# Patient Record
Sex: FEMALE | Race: BLACK OR AFRICAN AMERICAN | NOT HISPANIC OR LATINO | Employment: FULL TIME | URBAN - METROPOLITAN AREA
[De-identification: names, ages, dates, MRNs, and addresses within clinical notes are randomized per-mention and may not be internally consistent; named-entity substitution may affect disease eponyms.]

---

## 2019-05-02 ENCOUNTER — OFFICE VISIT (OUTPATIENT)
Dept: NEUROSURGERY | Facility: CLINIC | Age: 54
End: 2019-05-02

## 2019-05-02 VITALS
WEIGHT: 262 LBS | DIASTOLIC BLOOD PRESSURE: 77 MMHG | BODY MASS INDEX: 44.73 KG/M2 | SYSTOLIC BLOOD PRESSURE: 134 MMHG | HEART RATE: 82 BPM | HEIGHT: 64 IN

## 2019-05-02 DIAGNOSIS — M50.30 DEGENERATION OF CERVICAL INTERVERTEBRAL DISC: ICD-10-CM

## 2019-05-02 DIAGNOSIS — M48.062 SPINAL STENOSIS, LUMBAR REGION, WITH NEUROGENIC CLAUDICATION: Primary | ICD-10-CM

## 2019-05-02 DIAGNOSIS — M51.36 DDD (DEGENERATIVE DISC DISEASE), LUMBAR: ICD-10-CM

## 2019-05-02 PROCEDURE — 99204 OFFICE O/P NEW MOD 45 MIN: CPT | Performed by: PHYSICIAN ASSISTANT

## 2019-05-02 RX ORDER — METHOCARBAMOL 750 MG/1
750 TABLET, FILM COATED ORAL 4 TIMES DAILY PRN
COMMUNITY

## 2019-05-02 NOTE — PROGRESS NOTES
Subjective   Patient ID: Hetal Ashley is a 53 y.o. female is here today as a self referral.  She returns to our office with increased back and left hip pain .She denies any recent cause or injury.  She had one SONI last year with [facility] pain management with no relief.  Mrs. Ashley takes Hydrocodone 7.5/325 QID and Robaxin 750 mg for pain.     Back Pain   This is a chronic problem. The problem occurs constantly. The problem has been gradually worsening since onset. The pain is present in the lumbar spine. The quality of the pain is described as aching (pulling ). The pain is at a severity of 10/10. The pain is severe. The symptoms are aggravated by position and bending. Pertinent negatives include no bladder incontinence, bowel incontinence, fever, perianal numbness or weakness. She has tried heat (1 SONI ) for the symptoms. The treatment provided mild relief.   Hip Pain    The pain is present in the left hip. The pain is at a severity of 10/10. The pain is severe. The pain has been worsening since onset. Associated symptoms include muscle weakness. She has tried heat (1 SONI ) for the symptoms. The treatment provided mild relief.   Neck Pain    This is a chronic problem. The problem occurs constantly. The pain is associated with nothing. The pain is at a severity of 5/10. Pertinent negatives include no fever or weakness.       The following portions of the patient's history were reviewed and updated as appropriate: allergies, current medications, past family history, past medical history, past social history, past surgical history and problem list.    Review of Systems   Constitutional: Negative for fever.   Cardiovascular: Positive for leg swelling.   Gastrointestinal: Negative for bowel incontinence.   Endocrine: Positive for polyuria.   Genitourinary: Negative for bladder incontinence and difficulty urinating.   Musculoskeletal: Positive for arthralgias, back pain (left hip pain ), joint swelling, neck  pain and neck stiffness.   Neurological: Negative for weakness.   Hematological: Bruises/bleeds easily.   All other systems reviewed and are negative.      Objective   Physical Exam   Constitutional: She is oriented to person, place, and time. She appears well-developed and well-nourished.   HENT:   Head: Normocephalic and atraumatic.   Right Ear: External ear normal.   Left Ear: External ear normal.   Eyes: Conjunctivae and EOM are normal. Pupils are equal, round, and reactive to light. Right eye exhibits no discharge. Left eye exhibits no discharge.   Neck: Normal range of motion. Neck supple. No tracheal deviation present.   Cardiovascular: Intact distal pulses.   Pulmonary/Chest: Effort normal. No stridor. No respiratory distress.   Musculoskeletal: Normal range of motion. She exhibits no edema, tenderness or deformity.   Neurological: She is alert and oriented to person, place, and time. She has normal strength and normal reflexes. She displays no atrophy, no tremor and normal reflexes. No cranial nerve deficit or sensory deficit. She exhibits normal muscle tone. She displays a negative Romberg sign. She displays no seizure activity. Coordination and gait normal.   No long tract signs   Skin: Skin is warm and dry.   Psychiatric: She has a normal mood and affect. Her behavior is normal. Judgment and thought content normal.   Nursing note and vitals reviewed.    Neurologic Exam     Mental Status   Oriented to person, place, and time.     Cranial Nerves     CN III, IV, VI   Pupils are equal, round, and reactive to light.  Extraocular motions are normal.     Motor Exam     Strength   Strength 5/5 throughout.       Assessment/Plan   Independent Review of Radiographic Studies:    Did review the lumbar spine MRI from [facility] done on January 23, 2017.  It shows multilevel degenerative disc disease with degenerative scoliosis.  There is fairly severe spinal stenosis at L4-L5 and L3-L4 with moderate stenosis at  L2-L3.  Medical Decision Making:    Ms. Ashley was previously evaluated by Dr. Mendez and had a posterior cervical surgery in 2010 for myelopathy.  She has been followed for many years for chronic pain by pain management.  She last had some type of injection about a year ago but cannot recall what it was but does state that it was in the lumbar region.  She has always had issues with low back pain but over the last year she is complained of increasing back pain with left buttock and lateral thigh pain.  The pain is much worse with any standing or walking.  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  She does also have diabetes and a rather extensive cardiac history with a most recent cardiac stent placed by Dr. Carrillo in August 2018.  She is on aspirin and was on Brilinta until just a few weeks ago when Dr. Moon switched her to aspirin and Plavix.  She is not certain but expects that she will need to be on those until at least this summer but may be on them long-term.    Did review the previous lumbar MRI with her.  2 years ago her stenosis was quite severe at 3 4 and 4 5 which could certainly explain her radicular symptoms.  Given her increasing pain as well as neck pain I will go ahead and order a new lumbar MRI as well as cervical MRI.  In the interim I suggested that she call Dr. Moon to determine when she would be cleared to stop her aspirin and Plavix temporarily to consider any additional pain management procedures or intervention.  She will follow-up after the imaging.  Hetal was seen today for back pain and hip pain.    Diagnoses and all orders for this visit:    Spinal stenosis, lumbar region, with neurogenic claudication  -     MRI Cervical Spine With & Without Contrast; Future  -     MRI Lumbar Spine With & Without Contrast; Future  -     XR Cervical Spine AP & Lat with Flex and Ext. views; Future  -     XR Spine Lumbar Complete With  Flex & Ext; Future    DDD (degenerative disc disease), lumbar  -     MRI Cervical Spine With & Without Contrast; Future  -     MRI Lumbar Spine With & Without Contrast; Future  -     XR Cervical Spine AP & Lat with Flex and Ext. views; Future  -     XR Spine Lumbar Complete With Flex & Ext; Future    Degeneration of cervical intervertebral disc  -     MRI Cervical Spine With & Without Contrast; Future  -     MRI Lumbar Spine With & Without Contrast; Future  -     XR Cervical Spine AP & Lat with Flex and Ext. views; Future  -     XR Spine Lumbar Complete With Flex & Ext; Future      Return for follow up after radiology test.

## 2019-05-08 ENCOUNTER — TELEPHONE (OUTPATIENT)
Dept: NEUROSURGERY | Facility: CLINIC | Age: 54
End: 2019-05-08

## 2019-05-08 NOTE — TELEPHONE ENCOUNTER
Patient called and wanted her mri and xr orders sent to [facility] and gave me the fax number.  I did let the patient know to call [facility] and cancel her appointment.  The patient stated she will call and cancel.

## 2019-05-21 NOTE — PROGRESS NOTES
Subjective   Patient ID: Hetal Ashley is a 53 y.o. female is here today for follow-up on neck, back, left hip pain after Cervical and lumbar MRI's and XR's. She states her symptoms are unchanged since last visit.  Mrs. Ashley has tried CBD oil and  takes Hydrocodone 7.5/325 QID and Robaxin 750 mg for pain.     She has not spoke to Dr. Moon about stopping ASA or Plavix.     Back Pain   This is a chronic problem. The problem occurs constantly. The problem has been gradually worsening since onset. The pain is present in the lumbar spine. The quality of the pain is described as aching (pulling ). The pain is at a severity of 10/10. The pain is severe. The pain is the same all the time. The symptoms are aggravated by position and bending. Pertinent negatives include no bladder incontinence, bowel incontinence, fever, leg pain, numbness, perianal numbness, tingling or weakness. Risk factors include obesity. She has tried heat (1 SONI ) for the symptoms. The treatment provided mild relief.   Hip Pain    The pain is present in the left hip. The pain is at a severity of 10/10. The pain is severe. The pain has been worsening since onset. Associated symptoms include muscle weakness. Pertinent negatives include no numbness or tingling. She has tried heat (1 SONI ) for the symptoms. The treatment provided mild relief.   Neck Pain    This is a chronic problem. The problem occurs constantly. The pain is associated with nothing. Quality: heaviness. The pain is at a severity of 5/10. The symptoms are aggravated by position. The pain is worse during the day. Pertinent negatives include no fever, leg pain, numbness, tingling or weakness. The treatment provided mild relief.       The following portions of the patient's history were reviewed and updated as appropriate: allergies, current medications, past family history, past medical history, past social history, past surgical history and problem list.    Review of Systems    Constitutional: Negative for fever.   Gastrointestinal: Negative for bowel incontinence.   Genitourinary: Negative for bladder incontinence and difficulty urinating.   Musculoskeletal: Positive for back pain (left hip pain ) and neck pain.   Neurological: Negative for tingling, weakness and numbness.   All other systems reviewed and are negative.      Objective   Physical Exam   Constitutional: She is oriented to person, place, and time. She appears well-developed and well-nourished.   HENT:   Head: Normocephalic and atraumatic.   Right Ear: External ear normal.   Left Ear: External ear normal.   Eyes: Conjunctivae and EOM are normal. Pupils are equal, round, and reactive to light. Right eye exhibits no discharge. Left eye exhibits no discharge.   Neck: Normal range of motion. Neck supple. No tracheal deviation present.   Cardiovascular: Intact distal pulses.   Pulmonary/Chest: Effort normal. No stridor. No respiratory distress.   Musculoskeletal: Normal range of motion. She exhibits no edema, tenderness or deformity.   Neurological: She is alert and oriented to person, place, and time. She has normal strength and normal reflexes. She displays no atrophy, no tremor and normal reflexes. No cranial nerve deficit or sensory deficit. She exhibits normal muscle tone. She displays a negative Romberg sign. She displays no seizure activity. Coordination and gait normal.   No long tract signs   Skin: Skin is warm and dry.   Psychiatric: She has a normal mood and affect. Her behavior is normal. Judgment and thought content normal.   Nursing note and vitals reviewed.    Neurologic Exam     Mental Status   Oriented to person, place, and time.     Cranial Nerves     CN III, IV, VI   Pupils are equal, round, and reactive to light.  Extraocular motions are normal.     Motor Exam     Strength   Strength 5/5 throughout.       Assessment/Plan   Independent Review of Radiographic Studies:    I did review the new cervical MRI and  lumbar MRI completed at [facility] on May 22, 2019.  The cervical MRI does show multilevel fairly severe degenerative disc disease as well as disc osteophyte complexes with moderate to severe central stenosis at C3-C4 C4-C5 and C5-C6.  There is fairly severe foraminal narrowing at all of those levels as well.  There does appear to be at least moderate stenosis at C7-T1 as well although the images are far less clear.  In the lumbar spine she has again multilevel degenerative disc disease with a degenerative anterolisthesis at L4-L5.  There is severe central stenosis at L3-L4 and L4-L5 and fairly severe foraminal narrowing on the right at L1-L2.  Medical Decision Making:    Ms. Ashley was previously evaluated by Dr. Mendez and had a posterior cervical surgery in 2010 for myelopathy.  She has been followed for many years for chronic pain by pain management. She was here a few weeks ago c/o increasing back pain with left buttock and lateral thigh pain.  The pain is much worse with any standing or walking.  She also reports chronic neck pain. 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  She does also have diabetes and a rather extensive cardiac history with a most recent cardiac stent placed by Dr. Carrillo in August 2018.  She is on aspirin and was on Brilinta until just about 4wks ago when Dr. Moon switched her to aspirin and Plavix.  She is not certain but expects that she will need to be on those until at least this summer but may be on them long-term.    She has been followed by pain management but has not had any injections in the last 8 to 9 months because of her cardiac stent.  She has an appointment with them next week.    She is due for a repeat cardiac echo and follow-up with Dr. Moon.    I had a very extensive 45-minute conversation with Ms. Ashley reguarding the MRI results.  I explained to her that the left buttock and lateral thigh  pain she is experiencing that is limiting her ability to stand or walk for prolonged periods of time is certainly secondary to the lumbar spinal stenosis.  She is extremely clear however that she is not willing to consider surgery at this point.  I encouraged her to think about it or at the very least to discuss the possibility of additional injections with her pain management physician assuming that Dr. Moon clears her to stop her aspirin and Plavix.  While I did tell her that we can avoid surgery in the lumbar spine for as long as she can tolerate the pain I did explain that the cervical stenosis is a much more potentially dangerous situation and explained the risks of neurologic damage and even potential paralysis if the cervical stenosis was not followed or treated.  I think the best course of action would be to order a myelogram to further delineate the degree of stenosis and have her return to discuss potential surgery with Dr. Mendez.  While she is not really exhibiting any signs or symptoms of myelopathy at this time she understands that that could change at any point.  She also understands that her diabetes can mask some of the more classic physical findings of myelopathy including hyperreflexia and long tract signs.  She would need clearance to stop her blood thinners from Dr. Moon before the myelogram could be ordered.  I suspect again that he will not clear her until August, 1 year after her stent placement.  I told her to call him today and get her echocardiogram and follow-up appointment rescheduled.  I will plan on seeing her back in August and at that point if he has cleared her we can order the myelogram.  She also voiced understanding of the fact that she must contact us sooner if she does begin to experience any weakness, gait or balance difficulties, incontinence, numbness or tingling in her arms or legs.  She will also continue to think about the possibility of lumbar surgery for her  lumbar stenosis and radiculopathy.              Hetal was seen today for neck pain, back pain and hip pain.    Diagnoses and all orders for this visit:    Spinal stenosis, lumbar region, with neurogenic claudication    DDD (degenerative disc disease), lumbar    Degeneration of cervical intervertebral disc    Spinal stenosis in cervical region      Return in about 10 weeks (around 8/6/2019).

## 2019-05-24 DIAGNOSIS — M48.062 SPINAL STENOSIS, LUMBAR REGION, WITH NEUROGENIC CLAUDICATION: ICD-10-CM

## 2019-05-24 DIAGNOSIS — M51.36 DDD (DEGENERATIVE DISC DISEASE), LUMBAR: ICD-10-CM

## 2019-05-24 DIAGNOSIS — M50.30 DEGENERATION OF CERVICAL INTERVERTEBRAL DISC: ICD-10-CM

## 2019-05-28 ENCOUNTER — OFFICE VISIT (OUTPATIENT)
Dept: NEUROSURGERY | Facility: CLINIC | Age: 54
End: 2019-05-28

## 2019-05-28 VITALS
DIASTOLIC BLOOD PRESSURE: 79 MMHG | HEART RATE: 78 BPM | BODY MASS INDEX: 44.73 KG/M2 | WEIGHT: 262 LBS | HEIGHT: 64 IN | SYSTOLIC BLOOD PRESSURE: 174 MMHG

## 2019-05-28 DIAGNOSIS — M51.36 DDD (DEGENERATIVE DISC DISEASE), LUMBAR: ICD-10-CM

## 2019-05-28 DIAGNOSIS — M50.30 DEGENERATION OF CERVICAL INTERVERTEBRAL DISC: ICD-10-CM

## 2019-05-28 DIAGNOSIS — M48.062 SPINAL STENOSIS, LUMBAR REGION, WITH NEUROGENIC CLAUDICATION: Primary | ICD-10-CM

## 2019-05-28 DIAGNOSIS — M48.062 SPINAL STENOSIS, LUMBAR REGION, WITH NEUROGENIC CLAUDICATION: ICD-10-CM

## 2019-05-28 DIAGNOSIS — M48.02 SPINAL STENOSIS IN CERVICAL REGION: ICD-10-CM

## 2019-05-28 PROCEDURE — 99214 OFFICE O/P EST MOD 30 MIN: CPT | Performed by: PHYSICIAN ASSISTANT

## 2019-06-17 ENCOUNTER — TELEPHONE (OUTPATIENT)
Dept: NEUROSURGERY | Facility: CLINIC | Age: 54
End: 2019-06-17

## 2019-06-17 NOTE — TELEPHONE ENCOUNTER
1.  We need clearance from Dr. Freeman that she can stop her blood thinners.     2.  Dr. NOVAK and Dr. LUCAS only operate at [facility] and the myelogram would have to be done here.  I think she works for [employer] and I completely understand why she would want it done there but we don't do that.  If she would like a referral to a [facility] doc instead I would refer her to Dr. Kari Marrero. But please tell her that any imaging and/or surgery here would be out of network with her [employer] insurance since we are [employer] employed and everything would have to be done here at [facility].    Just let me know what she says.     Thanks, Page

## 2019-06-17 NOTE — TELEPHONE ENCOUNTER
She has decided to have the myelogram however, she said she to have it done at [facility] because of her insurance.

## 2019-06-18 ENCOUNTER — TELEPHONE (OUTPATIENT)
Dept: NEUROSURGERY | Facility: CLINIC | Age: 54
End: 2019-06-18

## 2019-06-18 NOTE — TELEPHONE ENCOUNTER
I let patient know that I confirmed she would need to have myelogram performed at [facility] if she decided to move forward with it. I also let her know that we can refer her to Dr. Marrero with [facility] if she preferred to have the myelogram done at [facility].      She said she would call our office back to let us know.

## 2019-06-18 NOTE — TELEPHONE ENCOUNTER
I spoke to pt she still insists on having myelogram ordered faxed to [facility] to have it done there. I explained to her what Page had said. She does not want to see any Neurosurgeons with [facility].     She said it will cost her thousands more to have myelogram done at [facility] because she [employer] employee.    I explained to her that Dr. Mendez only does surgeries at [facility] and if surgery was indicated then it might be out of network for her also.     She said she's rather have one bill is surgery is indicated verse two being myelogram plus surgery bills. Since she has had surgery with Dr. Mendez in the past.

## 2019-06-19 NOTE — TELEPHONE ENCOUNTER
Antonietta with Dr. Freeman's office called back. She asked if the patient was currently scheduled to have the Myelogram? I told her I did not see where it is currently scheduled. She states the patient had a heart cath in August 2018 and she will need to be a year out from that cath until she can stop any medications.     Antonietta can be reached at [phone]

## 2019-06-19 NOTE — TELEPHONE ENCOUNTER
We cannot order a myelogram unless we have clearance from Dr. Freeman that she is both ok to stop her blood thinners and cleared for any needed surgery.     PLease make sure these are done. If not then cancel the myelogram and make sure it is not scheduled.

## 2019-06-19 NOTE — TELEPHONE ENCOUNTER
Pt is aware that she will need to be cleared  To stop ASA and Plavix if myelogram is ordered.     I placed a called to Dr. Freeman's office to see if she would be cleared to stop ASA and Plavix.     [phone]

## 2019-06-20 NOTE — TELEPHONE ENCOUNTER
I let her know that per Dr. Moon's office she can not stop ASA and Plavix until after August 2019.      I let her know she will need to be cleared 1st then call us back after she has been cleared to schedule myelogram.    Once cleared myelogram will need to be done at [facility].

## 2021-12-08 ENCOUNTER — TELEPHONE (OUTPATIENT)
Dept: NEUROLOGY | Facility: OTHER | Age: 56
End: 2021-12-08

## 2021-12-08 NOTE — TELEPHONE ENCOUNTER
PATIENT WOULD LIKE TO SCHEDULE AN APPT. NO CURRENT IMAGING.  LAST SEEN 2019  PLEASE ADVISE.  SHE WISHES TO BE SEEN FOR HER CERVICAL

## 2022-01-05 ENCOUNTER — TELEPHONE (OUTPATIENT)
Dept: NEUROSURGERY | Facility: CLINIC | Age: 57
End: 2022-01-05

## 2022-01-05 NOTE — TELEPHONE ENCOUNTER
SPOKE WITH PATIENT AND WOULD LIKE TO CANCELLED APPOINTMENT DUE TO WEATHER WILL CALL BACK FOR ANOTHER APPOINTMENT

## 2024-02-06 ENCOUNTER — TRANSCRIBE ORDERS (OUTPATIENT)
Dept: PHYSICAL THERAPY | Facility: CLINIC | Age: 59
End: 2024-02-06
Payer: COMMERCIAL

## 2024-02-06 DIAGNOSIS — M51.37 OTHER INTERVERTEBRAL DISC DEGENERATION, LUMBOSACRAL REGION: Primary | ICD-10-CM
